# Patient Record
Sex: MALE | Race: WHITE | NOT HISPANIC OR LATINO | URBAN - METROPOLITAN AREA
[De-identification: names, ages, dates, MRNs, and addresses within clinical notes are randomized per-mention and may not be internally consistent; named-entity substitution may affect disease eponyms.]

---

## 2021-11-12 NOTE — PATIENT DISCUSSION
Updated glasses and CLRx released to patient. Discussed proper wear/care/hygiene of CLs. RTC if discomfort.

## 2021-11-12 NOTE — PATIENT DISCUSSION
Educated patient on findings. Prescribe CL-safe artificial tears BID/prn OU. Advised to RTC if si/sx persist or worsen. Monitor.

## 2022-06-20 RX ORDER — GABAPENTIN 400 MG/1
1 CAPSULE ORAL
COMMUNITY

## 2022-06-20 RX ORDER — VALSARTAN 40 MG/1
TABLET ORAL
COMMUNITY
Start: 2022-01-06

## 2022-06-20 RX ORDER — METHADONE HYDROCHLORIDE 10 MG/1
TABLET ORAL
COMMUNITY

## 2022-07-28 PROBLEM — N40.1 BENIGN PROSTATIC HYPERPLASIA WITH LOWER URINARY TRACT SYMPTOMS: Status: ACTIVE | Noted: 2022-07-28

## 2022-07-28 PROBLEM — K21.9 GASTROESOPHAGEAL REFLUX DISEASE WITH ULCERATION: Status: ACTIVE | Noted: 2022-07-28

## 2022-07-28 PROBLEM — E03.9 HYPOTHYROIDISM: Status: ACTIVE | Noted: 2022-07-28

## 2022-07-28 PROBLEM — I10 PRIMARY HYPERTENSION: Status: ACTIVE | Noted: 2022-07-28

## 2022-07-28 PROBLEM — M54.9 BACKACHE: Status: ACTIVE | Noted: 2022-07-28

## 2022-07-28 PROBLEM — E29.1 HYPOGONADISM IN MALE: Status: ACTIVE | Noted: 2022-07-28

## 2022-07-28 PROBLEM — Q87.89: Status: ACTIVE | Noted: 2022-07-28

## 2022-07-28 PROBLEM — E78.5 HYPERLIPIDEMIA: Status: ACTIVE | Noted: 2022-07-28

## 2022-07-28 PROBLEM — E66.9 OBESITY (BMI 30.0-34.9): Status: ACTIVE | Noted: 2022-07-28

## 2022-07-28 PROBLEM — M50.90 CERVICAL DISC DISEASE: Status: ACTIVE | Noted: 2022-07-28

## 2022-07-28 PROBLEM — G93.32 CHRONIC FATIGUE SYNDROME: Status: ACTIVE | Noted: 2022-07-28

## 2022-12-06 ENCOUNTER — NEW PATIENT (OUTPATIENT)
Dept: URBAN - METROPOLITAN AREA CLINIC 17 | Facility: CLINIC | Age: 62
End: 2022-12-06

## 2022-12-06 PROCEDURE — 92015 DETERMINE REFRACTIVE STATE: CPT

## 2022-12-06 PROCEDURE — 99204 OFFICE O/P NEW MOD 45 MIN: CPT

## 2022-12-06 ASSESSMENT — TONOMETRY
OD_IOP_MMHG: 15
OS_IOP_MMHG: 15

## 2022-12-06 ASSESSMENT — VISUAL ACUITY
OD_CC: 20/25-1
OS_CC: 20/20